# Patient Record
(demographics unavailable — no encounter records)

---

## 2018-07-13 NOTE — CP.PCM.CON
History of Present Illness





- History of Present Illness


History of Present Illness: 





Podiatry Consult note: Dr. Cervantes





11 year old female with PMHx of eczema was seen and evaluated for left foot 

pain s/p trauma. Patient is seen with mother at bedside. Patient reports that 

she was playing in the park earlier where she was going down the slide and her 

foot struck against the metal part on the side. Patient reports that she 

immediately felt pain to the outside of the foot. Patient reports that she did 

not walk on the foot after the injury due to the pain. Patient grades her pain 

as 8/10 on VAS. Patient reports that the pain is tolerable and is not 

excruciating. Mother denies of giving any pain medication prior to presenting 

to the ED today. Patient denies of any numbness, tingling or burning to the 

left foot. Mother denies of recent F/N/V/C/SOB/CP/headache/diarrhea. Denies of 

any other pedal complains at this time. 





PMHx: Eczema


PSHx: Denies


Allergies: N.K.D.A


SHx: Lives at home with family


FHx: Non-contributory


Meds: none; Vaccines up to date





Review of Systems





- Constitutional


Constitutional: As Per HPI





Past Patient History





- Past Social History


Smoking Status: Never Smoked





- PSYCHIATRIC


Hx Substance Use: No





Meds


Allergies/Adverse Reactions: 


 Allergies











Allergy/AdvReac Type Severity Reaction Status Date / Time


 


No Known Allergies Allergy   Verified 11/01/17 14:11














Physical Exam





- Constitutional


Appears: Well, Non-toxic, No Acute Distress





- Extremities Exam


Extremities exam: Positive for: full ROM, joint swelling, normal capillary 

refill, pedal edema, tenderness, pedal pulses present.  Negative for: calf 

tenderness


Additional comments: 





Left LE focused exam





VASC: DP/PT pulses are palpable 2/4, Cap refill time: 3 sec to all digits (

compared with RLE which presents same return time), Temp gradient: warm to cold 

from proximal to distal on bilateral LE; localized non-pitting edema noted to 

the distal dorso-lateral left foot, no palor noted to the left foot 





DERM: no open lesions, no erythema, no clinical suspicion of active infection





NEURO: Protective sensation grossly intact





ORTHO: Pain on palpation at the dorsal and plantar foot at the level of the 4th 

metatarsal head and 5th metatarsal head with maximum tenderness at the 4th met 

head, mild tenderness during PROM of the 4th and 5th digit at the level of the 

MTPJ, no pain during AROM at the MTPJ, no pain at palpation at the base of the 

5th metatarsal, no pain on palpation of the metatarsal head neck and shaft of 2 

and 3, no pain on palpation along the course of the peroneal tendons, AROM and 

PROM at the ankle joint intact with no pain, MMT at the ankle joint 5/5 in all 

4 direction, no pain during palpation of the calf 








- Neurological Exam


Neurological exam: Alert, Oriented x3





- Psychiatric Exam


Psychiatric exam: Anxious, Normal Affect





Results





- Vital Signs


Recent Vital Signs: 


 Last Vital Signs











Temp  99.0 F   07/13/18 19:10


 


Pulse  110 H  07/13/18 19:10


 


Resp  22   07/13/18 19:10


 


BP  116/65   07/13/18 19:10


 


Pulse Ox  100   07/13/18 20:50














Assessment & Plan





- Assessment and Plan (Free Text)


Assessment: 





11 year old female patient with PMHx of eczema was evaluated for left foot 4th (

Salter Cat 4) and 5th (Salter Cat 2) metatarsal head fracture


Plan: 





Patient seen and evaluated at bedside


Discussed plan with attending Dr. Cervantes


Vitals and charts reviewed


X-rays of the foot evaluated


CT scan of the left foot ordered/reviewed 


   - - consistent with findings


Well padded posterior splint applied to the LLE


Patient and mother educated to remain strict NWB to the LLE using crutches


   -Crutches provided in the ED


Patient and the mother educated for the RICE protocol 


Mother educated to follow up in podiatry clinic on Monday morning


   - information provided


Demonstrated verbal understanding of the plan


Thank you for the interesting podiatry consult and allowing to take part in 

patient care





- Date & Time


Date: 07/13/18


Time: 22:06

## 2018-07-13 NOTE — ED PDOC
Lower Extremity Pain/Injury


Time Seen by Provider: 07/13/18 19:19


Chief Complaint (Nursing): Lower Extremity Problem/Injury


Chief Complaint (Provider): Left Foot Injury


History Per: Patient, Family (mother)


History/Exam Limitations: no limitations


Onset/Duration Of Symptoms: Hrs (earlier today at camp)


Current Symptoms Are (Timing): Still Present


Additional Complaint(s): 


11 year old female presents to the ED with mother for evaluation of a left foot 

injury. Patient states that while at [facility] earlier today, she went down a 

slide and struck her foot against the metal part of it. She rates her pain an 8/

10 and has trouble ambulating, but her mother denies giving any medications 

prior to arrival. Otherwise, (-) prior foot injury, (-) other complaints.





Vaccinations up to date.


PMD: [facility]





Past Medical History


Reviewed: Historical Data, Nursing Documentation, Vital Signs


Vital Signs: 





 Last Vital Signs











Temp  99.0 F   07/13/18 19:10


 


Pulse  110 H  07/13/18 19:10


 


Resp  22   07/13/18 19:10


 


BP  116/65   07/13/18 19:10


 


Pulse Ox  100   07/13/18 19:10














- Medical History


Other PMH: Eczema





- Surgical History


Surgical History: No Surg Hx





- Family History


Family History: States: Unknown Family Hx





- Living Arrangements


Living Arrangements: With Family





- Immunization History


Immunizations UTD: Yes





- Home Medications


Home Medications: 


 Ambulatory Orders











 Medication  Instructions  Recorded


 


Acetaminophen 20 ml PO Q4 PRN #500 ml 07/13/18


 


Ibuprofen [Children's Motrin] 21 ml PO Q6 PRN #500 ml 07/13/18














- Allergies


Allergies/Adverse Reactions: 


 Allergies











Allergy/AdvReac Type Severity Reaction Status Date / Time


 


No Known Allergies Allergy   Verified 11/01/17 14:11














Review of Systems


ROS Statement: Except As Marked, All Systems Reviewed And Found Negative


Musculoskeletal: Positive for: Foot Pain (left foot injury, with difficulty 

walking)





Physical Exam





- Reviewed


Nursing Documentation Reviewed: Yes


Vital Signs Reviewed: Yes





- Physical Exam


Comments: 


GENERAL APPEARANCE: Patient is awake, alert, oriented x 3, in no acute 

distress. Resting comfortably.


SKIN:  Warm, dry; (-) cyanosis.


LEFT LOWER EXTREMITY: (+) small effusion to dorsum of foot, (+) tenderness to 

mid foot and 3rd-5th metatarsals, (-) ecchymosis, (-) erythema, (-) skin break. 

Sensation and capillary refill intact. Remainder of LE: non tender, full ROM.


HEART AND CARDIOVASCULAR: (-) irregularity; (-) murmur, (-) gallop.


CHEST AND RESPIRATORY: (-) rales, (-) rhonchi, (-) wheezes; breath sounds equal.


NEURO AND PSYCH: Mental status as above.





- ECG


O2 Sat by Pulse Oximetry: 100 (RA)


Pulse Ox Interpretation: Normal





Medical Decision Making


Medical Decision Making: 


Time: 19:41


Initial Impression: acute foot pain, contusion, r/o fracture


Initial Plan:   


--Motrin 430 mg PO


--Left foot XR


--Re-evaluation





2031 


Foot XR: fracture to distal fourth metatarsal with intra-articular involvement. 

(?) 5th metatarsal fracture


Caretaker advised that official radiology read of XR is still pending and will 

call the patient if there is any discrepancy within 24 hours.


Consult placed to podiatry in light of XR findings.





2035


Case discussed with MARIO Chanel, podiatry resident, who is agreeable to evaluate 

patient in ED.





2040


Podiatry at bedside. See consult note.





2045


Per podiatry evaluation, patient needs CT.


CT lower extremity without contrast ordered.





2150


Patient sleeping comfortably, awaiting CT evaluation. Pain well controlled at 

this time.





2210


Patient in CT scan.





2250


Patient returned from CT without incident. 


Podiatry at bedside placing posterior leg splint. 


Patient supplied with crutches and instructed on crutch walking by ED staff. 





2300


CT reviewed, radiology report follows


EXAM:


CT Left Lower Extremity Without Intravenous Contrast, Foot


CLINICAL HISTORY:


11 years old, female; Injury or trauma; Fall; Initial encounter; Fracture, 

traumatic; Other: Unknown;


Metatarsal; Left; Additional info: 4th metatarsal FX, R/O 5th metatarsal FX


TECHNIQUE:


Axial computed tomography images of the left foot without intravenous contrast. 

All CT scans at this


facility use at least one of these dose optimization techniques: automated 

exposure control; mA


and/or kV adjustment per patient size (includes targeted exams where dose is 

matched to clinical


indication); or iterative reconstruction.


COMPARISON:


No relevant prior studies available.


FINDINGS:


Bones/joints: Fracture of the fourth metatarsal epiphysis extending into the 

physis. Nondisplaced


fracture of the fifth metatarsal epiphysis, extending into the physis. Normal 

appearance of the fifth


metatarsal apophysis. No dislocation.


Soft tissues: Dorsal/lateral forefoot soft tissue swelling. No radiopaque 

foreign body.


IMPRESSION:


Nondisplaced fractures of the fourth and fifth metatarsal epiphyses extending 

into the physes,


consistent with Salter-Cat III fractures. Recommend orthopedic consultation.


Thank you for allowing us to participate in the care of your patient.


Dictated and Authenticated by: River Strong DO 07/13/2018 10:52 PM Eastern 

Time ([place] & [place])








2305


Repeat HR: 75


NV intact after placement of splint by podiatry.


On re-evaluation, patient reports improvement of symptoms. On exam, patient 

remains awake, alert, cheerful and in no acute distress. Neck is supple, lungs 

CTA, cardiac RRR, neuro exam shows no focal findings. VSS, stable for 

discharge. RICE encouraged.


Diagnostic results d/w the parent in great detail. Dx of metatarsal fractures, 

acute foot pain and swelling d/w the caretaker. 


Based on history, exam and diagnostic results plan will be for discharge and 

outpatient follow up with podiatry clinic on 7/16/18.





Caretaker advised to follow up with primary care physician/podiatry as 

directed. Advised to give medication as prescribed. Return to the emergency 

room at any time for any new or worsening symptoms.





Caretaker states she fully agrees with and understands discharge instructions. 

States that she agrees with the plan and disposition. Verbalized and repeated 

discharge instructions and plan. I have given the caretaker opportunity to ask 

any additional questions. 


--------------------------------------------------------------------------------

----------------------------------------------------------------------------


Scribe Attestation:


Documented by Alisa Rossi, acting as a scribe for Shelly Grayson PA-C.





Provider Scribe Attestation:


All medical record entries made by the Scribe were at my direction and 

personally dictated by me. I have reviewed the chart and agree that the record 

accurately reflects my personal performance of the history, physical exam, 

medical decision making, and the department course for this patient. I have 

also personally directed, reviewed, and agree with the discharge instructions 

and disposition.





Disposition





- Clinical Impression


Clinical Impression: 


 Foot pain, Foot swelling, Metatarsal bone fracture








- Patient ED Disposition


Is Patient to be Admitted: No


Counseled Patient/Family Regarding: Studies Performed, Diagnosis, Need For 

Followup, Rx Given





- Disposition


Referrals: 


Podiatry Clinic [Outside]


Disposition: Routine/Home


Disposition Time: 23:05


Condition: STABLE


Additional Instructions: 


Follow up with podiatry clinic on 7/16/18 as directed.


Return to ED with any new or worsening symptoms.


Do not bear weight on affected extremity.


Keep splint clean and dry.





REST


ICE


COMPRESS (SPLINT)


ELEVATE


Prescriptions: 


Acetaminophen 20 ml PO Q4 PRN #500 ml


 PRN Reason: Pain, Moderate (4-7)


Ibuprofen [Children's Motrin] 21 ml PO Q6 PRN #500 ml


 PRN Reason: Pain, Severe (8-10)


Instructions:  Muscle and Bone Pain (DC), Foot Fracture (DC)


Forms:  [facility] (English)


Print Language: ENGLISH





- POA


Present On Arrival: Falls Or Trauma

## 2018-07-14 NOTE — RAD
Date of service: 



07/13/2018



PROCEDURE:  Left Foot Radiographs.



HISTORY:

joint pain  



COMPARISON:

None.



FINDINGS:



BONES:

No evidence of acute displaced fracture nor dislocation. 



JOINTS:

Normal. 



SOFT TISSUES:

Questionable mild dorsal soft tissue swelling at the level of the mid 

to distal metatarsals. No subcutaneous emphysema. No radiopaque 

foreign bodies are identified. 



OTHER FINDINGS:

None.



IMPRESSION:

Questionable mild dorsal soft tissue swelling as described. No 

evidence of acute displaced fracture nor dislocation.  If symptoms 

persist consider followup MRI

## 2018-07-14 NOTE — CT
Date of service: 



07/13/2018



PROCEDURE:  CT of the left foot 



HISTORY:

4th metatarsal fx, r/o 5th metatarsal fx



COMPARISON:

None available. 



TECHNIQUE:

Contiguous axial images of the left foot were obtained. Coronal and 

sagittal reformats were generated.



This CT exam was performed using one or more of the following dose 

reduction techniques: Automated exposure control, adjustment of the 

mA and/or kV according to patient size, and/or use of iterative 

reconstruction technique. 



Radiation dose: Total DLP = 309.98 mGy 



FINDINGS:



BONES:

There are fractures traversing the epiphyses of the distal 4th and 

5th metatarsals. There is also surrounding soft tissue swelling most 

pronounced dorsally. .  No other definitive fractures are identified. 



No significant osteoarthritis.



SOFT TISSUES:

No definitive radiopaque foreign bodies are identified



IMPRESSION:

There are fractures traversing the epiphyses of the distal 4th and 

5th metatarsals. There is also surrounding soft tissue swelling most 

pronounced dorsally